# Patient Record
Sex: FEMALE | Race: WHITE | NOT HISPANIC OR LATINO
[De-identification: names, ages, dates, MRNs, and addresses within clinical notes are randomized per-mention and may not be internally consistent; named-entity substitution may affect disease eponyms.]

---

## 2023-06-08 PROBLEM — Z00.00 ENCOUNTER FOR PREVENTIVE HEALTH EXAMINATION: Status: ACTIVE | Noted: 2023-06-08

## 2023-06-20 ENCOUNTER — NON-APPOINTMENT (OUTPATIENT)
Age: 63
End: 2023-06-20

## 2023-06-20 ENCOUNTER — APPOINTMENT (OUTPATIENT)
Dept: NEUROSURGERY | Facility: CLINIC | Age: 63
End: 2023-06-20
Payer: MEDICAID

## 2023-06-20 VITALS
TEMPERATURE: 98.6 F | DIASTOLIC BLOOD PRESSURE: 74 MMHG | HEART RATE: 83 BPM | BODY MASS INDEX: 40.26 KG/M2 | SYSTOLIC BLOOD PRESSURE: 133 MMHG | WEIGHT: 250.5 LBS | OXYGEN SATURATION: 97 % | RESPIRATION RATE: 81 BRPM | HEIGHT: 66 IN

## 2023-06-20 DIAGNOSIS — I67.2 CEREBRAL ATHEROSCLEROSIS: ICD-10-CM

## 2023-06-20 DIAGNOSIS — Z01.818 ENCOUNTER FOR OTHER PREPROCEDURAL EXAMINATION: ICD-10-CM

## 2023-06-20 PROCEDURE — 99205 OFFICE O/P NEW HI 60 MIN: CPT

## 2023-06-21 NOTE — ASSESSMENT
[FreeTextEntry1] : I have discussed the natural history and treatment options for intracranial atherosclerosis and symptomatic left M1 stenosis with the patient. I explained the indications for observation and imaging surveillance, medical management, and surgery. I discussed the risks, benefits, possible complications and expected outcome related to each treatment option.  In the end, I recommend MRI brain/MRA NOVA study (which will need to be done under sedation). The patient should continue ASA 81mg daily and should continue daily statin therapy with aim for goal LDL <70 and metformin for HbA1c <6.0 for stroke prevention.  We will obtain SPECT with diamox study to check for cerebrovascular reserve.   We will plan a diagnostic cerebral angiogram in the coming weeks to evaluate her cerebral vasculature.  We will obtain updated accumetrics for ASA efficacy and Lipid panel and HbA1c to assess stroke risk factors.  The patient understands the plan of care and is in agreement.  All questions answered to patient satisfaction.\par \par \par \par

## 2023-06-21 NOTE — HISTORY OF PRESENT ILLNESS
[de-identified] : SUSANNE BYRD is a 62 year right-handed Polish speaking female with a PMH of Asthma/COPD, hypothyroid, current 5-6 cigarettes/day smoker, DM, HTN, HLD, morbid obesity  who presents to the office today for neuroendovascular consultation due to recent ED visit to [facility] for headache and hypertensive urgency on [facility] Day.  Stroke code was activated and her head CT was negative for hemorrhage and CTA showed Left sided M1 stenosis.  She reports that 2 days prior, she had an episode of right sided numbness of her face arm and leg and difficulty getting words out that lasted a few minutes.  She does not recall if there was any weakness at the time.  She has been on baby ASA and atorvastatin long-standing and reports compliance with her medications.  BP was treated and she was seen by Neurology there who recommended neuroendovascular outpatient follow up for stenosis.  She did not undergo an MRI at the time due to claustrophobia.  She was discharged home with her previous regimen of baby ASA and atorvastatin 40mg.  Recent Lipid panel or A1c not available.  She has not had any additional episodes since that day, however she does report a similar episode about a year ago in which she experienced right sided transient numbness but no speech difficulty, that self-resolved, and for which she did not seek medical attention at the time. \par \par The patient denies headaches, visual change, numbness, weakness of extremities, speech disturbance, dizziness, neck pain, vertigo.  \par Surg Hx: thyroid surgery, hysterectomy, knee surgery, varicose vein surgery, breast biopsy (benign)\par Meds: albuterol, norvasc, atenolol, synthroid, metformin, pantoprazole.  \par Allergies: NKDA\par Soc Hx: current smoker, no EtOH\par \par

## 2023-06-30 ENCOUNTER — RESULT REVIEW (OUTPATIENT)
Age: 63
End: 2023-06-30

## 2023-07-13 LAB
ALBUMIN SERPL ELPH-MCNC: 4.4 G/DL
ALP BLD-CCNC: 100 U/L
ALT SERPL-CCNC: 25 U/L
ANION GAP SERPL CALC-SCNC: 14 MMOL/L
APPEARANCE: CLEAR
APTT BLD: 30.4 SEC
AST SERPL-CCNC: 13 U/L
BACTERIA: NEGATIVE /HPF
BILIRUB SERPL-MCNC: 0.4 MG/DL
BILIRUBIN URINE: NEGATIVE
BLOOD URINE: NEGATIVE
BUN SERPL-MCNC: 18 MG/DL
CALCIUM SERPL-MCNC: 9.2 MG/DL
CAST: 0 /LPF
CHLORIDE SERPL-SCNC: 106 MMOL/L
CHOLEST SERPL-MCNC: 180 MG/DL
CO2 SERPL-SCNC: 21 MMOL/L
COLOR: YELLOW
CREAT SERPL-MCNC: 0.81 MG/DL
EGFR: 82 ML/MIN/1.73M2
EPITHELIAL CELLS: 6 /HPF
ESTIMATED AVERAGE GLUCOSE: 134 MG/DL
GLUCOSE QUALITATIVE U: NEGATIVE MG/DL
GLUCOSE SERPL-MCNC: 102 MG/DL
HBA1C MFR BLD HPLC: 6.3 %
HDLC SERPL-MCNC: 42 MG/DL
INR PPP: 1.03 RATIO
KETONES URINE: NEGATIVE MG/DL
LDLC SERPL CALC-MCNC: 93 MG/DL
LEUKOCYTE ESTERASE URINE: NEGATIVE
MICROSCOPIC-UA: NORMAL
NITRITE URINE: NEGATIVE
NONHDLC SERPL-MCNC: 138 MG/DL
PH URINE: 6
PLATELET RESPONSE ASPIRIN: 462 ARU
POTASSIUM SERPL-SCNC: 4.4 MMOL/L
PROT SERPL-MCNC: 6.9 G/DL
PROTEIN URINE: NEGATIVE MG/DL
PT BLD: 11.9 SEC
RED BLOOD CELLS URINE: 1 /HPF
SODIUM SERPL-SCNC: 141 MMOL/L
SPECIFIC GRAVITY URINE: 1.02
TRIGL SERPL-MCNC: 268 MG/DL
UROBILINOGEN URINE: 0.2 MG/DL
WHITE BLOOD CELLS URINE: 0 /HPF

## 2023-08-04 ENCOUNTER — TRANSCRIPTION ENCOUNTER (OUTPATIENT)
Age: 63
End: 2023-08-04

## 2023-08-04 ENCOUNTER — APPOINTMENT (OUTPATIENT)
Dept: NEUROSURGERY | Facility: HOSPITAL | Age: 63
End: 2023-08-04

## 2023-08-04 ENCOUNTER — OUTPATIENT (OUTPATIENT)
Dept: OUTPATIENT SERVICES | Facility: HOSPITAL | Age: 63
LOS: 1 days | Discharge: HOME | End: 2023-08-04
Payer: MEDICAID

## 2023-08-04 ENCOUNTER — RESULT REVIEW (OUTPATIENT)
Age: 63
End: 2023-08-04

## 2023-08-04 LAB — GLUCOSE BLDC GLUCOMTR-MCNC: 92 MG/DL — SIGNIFICANT CHANGE UP (ref 70–99)

## 2023-08-04 PROCEDURE — 36227 PLACE CATH XTRNL CAROTID: CPT | Mod: LT

## 2023-08-04 PROCEDURE — 36226 PLACE CATH VERTEBRAL ART: CPT | Mod: RT

## 2023-08-04 PROCEDURE — 76937 US GUIDE VASCULAR ACCESS: CPT | Mod: 26

## 2023-08-04 PROCEDURE — 36224 PLACE CATH CAROTD ART: CPT | Mod: 50

## 2023-08-04 RX ORDER — SODIUM CHLORIDE 9 MG/ML
1000 INJECTION INTRAMUSCULAR; INTRAVENOUS; SUBCUTANEOUS
Refills: 0 | Status: DISCONTINUED | OUTPATIENT
Start: 2023-08-04 | End: 2023-08-18

## 2023-08-04 RX ORDER — CHLORHEXIDINE GLUCONATE 213 G/1000ML
1 SOLUTION TOPICAL ONCE
Refills: 0 | Status: DISCONTINUED | OUTPATIENT
Start: 2023-08-04 | End: 2023-08-18

## 2023-08-04 RX ORDER — FENTANYL CITRATE 50 UG/ML
25 INJECTION INTRAVENOUS ONCE
Refills: 0 | Status: DISCONTINUED | OUTPATIENT
Start: 2023-08-04 | End: 2023-08-04

## 2023-08-04 RX ORDER — SODIUM CHLORIDE 9 MG/ML
1000 INJECTION, SOLUTION INTRAVENOUS
Refills: 0 | Status: DISCONTINUED | OUTPATIENT
Start: 2023-08-04 | End: 2023-08-18

## 2023-08-04 RX ADMIN — SODIUM CHLORIDE 75 MILLILITER(S): 9 INJECTION, SOLUTION INTRAVENOUS at 10:09

## 2023-08-04 NOTE — PRE-ANESTHESIA EVALUATION ADULT - NSANTHOSAYNRD_GEN_A_CORE
No. MILVIA screening performed.  STOP BANG Legend: 0-2 = LOW Risk; 3-4 = INTERMEDIATE Risk; 5-8 = HIGH Risk

## 2023-08-04 NOTE — BRIEF OPERATIVE NOTE - OPERATION/FINDINGS
Patient was brought to the angio suite, placed on x-ray table, placed on standard monitor by anesthesiologist. Right wrist and B/l groins were prepped and draped in usual sterile fashion.   5 Malaysian short slender sheath was used in the right radial artery for access. Radial cocktail containing 2,000 units of heparin, 2.5mg of verapamil, and 200mcg of nitroglycerin were given.   A diagnostic angiogram was performed under monitored anesthesia care. B/l ICAs, b/l ECAs and right vertebral artery were injected and studied.     Findings:   There is severe stenosis of proximal left M1. There are pial collaterals to left MCA territory characteristic of moyamoya type phenomenon.   BIlateral Dayne are patent.     Full report to follow  Patient tolerated the procedure well and at baseline neurological condition.   Right radial vascular access site was applied TR band with 14cc of air  There is no hematoma.   Patient was transferred to cath lab recovery and will return home later today.     Above discussed with Dr. Hilliard.

## 2023-08-04 NOTE — BRIEF OPERATIVE NOTE - NSICDXBRIEFPREOP_GEN_ALL_CORE_FT
PRE-OP DIAGNOSIS:  Arterial ischemic stroke, MCA (middle cerebral artery), left, remote, resolved 04-Aug-2023 09:47:38  Ralph Contreras

## 2023-08-04 NOTE — BRIEF OPERATIVE NOTE - NSICDXBRIEFPOSTOP_GEN_ALL_CORE_FT
POST-OP DIAGNOSIS:  Arterial ischemic stroke, MCA (middle cerebral artery), left, remote, resolved 04-Aug-2023 09:47:42  Ralph Contreras

## 2023-08-04 NOTE — PROGRESS NOTE ADULT - SUBJECTIVE AND OBJECTIVE BOX
Procedure: Cerebral angiogram  Doctor: Dr. Hilliard  Consent: Signed by: patient                   NAME/NUMBER if not patient:     SUBJECTIVE:    62 year right-handed Polish speaking female with a PMH of Asthma/COPD, hypothyroid, current 5-6 cigarettes/day smoker, DM, HTN, HLD, morbid obesity, left M1 stenosis presenting for cerebral angiogram.   Patient had a recent ED visit to [facility] for headache and hypertensive urgency on Memorial Day. Stroke code was activated and her head CT was negative for hemorrhage and CTA showed Left sided M1 stenosis. She reports that 2 days prior, she had an episode of right sided numbness of her face arm and leg and difficulty getting words out that lasted a few minutes. She does not recall if there was any weakness at the time. She has been on baby ASA and atorvastatin long-standing and reports compliance with her medications. BP was treated and she was seen by Neurology there who recommended neuroendovascular outpatient follow up for stenosis. She did not undergo an MRI at the time due to claustrophobia. She was discharged home with her previous regimen of baby ASA and atorvastatin 40mg. Recent Lipid panel or A1c not available. She has not had any additional episodes since that day, however she does report a similar episode about a year ago in which she experienced right sided transient numbness but no speech difficulty, that self-resolved, and for which she did not seek medical attention at the time.     PMHx: as above  PSHx: thyroid surgery, hysterectomy, knee surgery, varicose vein surgery, breast biopsy (benign)  Social Hx: Current smoker, no alcohol, no drug use  Medications: albuterol, norvasc, atenolol, synthroid, metformin, pantoprazole.   Allergies: NKDA    T(C): --  HR: --  BP: --  RR: --  SpO2: --  Wt(kg): --    EXAM:  Neurological: AOx3, NAD, FC, speech coherent   CN II-XII: EOMI, PERRL, face symmetric, tongue midline   Motor: MAEx4 5/5 UE and LE B/L   SILT throughout  WWP throughout   No pronator drift   Cardiovascular: normal rate and rhythm  Respiratory: unlabored breathing on RA   Gastrointestinal: abd soft, NT, ND   Extremities: no LE edema, DP pulses intact                Pregnancy test (serum hcg for any female under 56, must be resulted day before OR): [ ] Negative Result  [ ] Positive Result  [ X] N/A : male or female>55 y/o      COVID swab (in past 72hrs): _ Y  XN    CXR: normal  EKG: NSR  ECHO if available: n/a  Medical Clearances: see paper chart    Heparin drip:               If yes --> Needs to be held 2 hours prior to angiogram, order placed: []yes   [X]no, primary team aware []yes   []no   []n/a    Contrast allergy: [] yes   [X] no  If yes --> premedication ordered []y []n    Implanted Devices (pacemaker, drug pump...etc):  []YES   [X] NO                  If yes --> EPS consulted to interrogate device: [ ] YES  [ ] NO                            If yes -->  EPS called to let them know patient going for procedure: [ ] device needs to be turned off                                                                                                                                                 [ ] magnet needs to be placed for surgery                                                                                                                                                [ ] nothing to do per EP, may proceed with cautery use in OR                                       Assessment:  62 year right-handed Polish speaking female with a PMH of Asthma/COPD, hypothyroid, current 5-6 cigarettes/day smoker, DM, HTN, HLD, morbid obesity, left M1 stenosis presenting for cerebral angiogram.   Plan:    - Consent for cerebral angiogram obtained and in chart, all risks, benefits and alternatives discussed including risk of bleeding at access site, infection, spinal headache, stroke, vessel dissection  - NPO/IVF  - Return to cath lab recovery post procedure     Assessment:  Present when checked    []  GCS  E   V  M     Heart Failure: []Acute, [] acute on chronic , []chronic  Heart Failure:  [] Diastolic (HFpEF), [] Systolic (HFrEF), []Combined (HFpEF and HFrEF), [] RHF, [] Pulm HTN, [] Other    [] SEAN, [] ATN, [] AIN, [] other  [] CKD1, [] CKD2, [] CKD 3, [] CKD 4, [] CKD 5, []ESRD    Encephalopathy: [] Metabolic, [] Hepatic, [] toxic, [] Neurological, [] Other    Abnormal Nurtitional Status: [] malnurtition (see nutrition note), [ ]underweight: BMI < 19, [] morbid obesity: BMI >40, [] Cachexia    [] Sepsis  [] hypovolemic shock,[] cardiogenic shock, [] hemorrhagic shock, [] neuogenic shock  [] Acute Respiratory Failure  []Cerebral edema, [] Brain compression/ herniation,   [] Functional quadriplegia  [] Acute blood loss anemia

## 2023-08-04 NOTE — BRIEF OPERATIVE NOTE - NSICDXBRIEFPROCEDURE_GEN_ALL_CORE_FT
PROCEDURES:  Angiogram, carotid and cerebral vessels, bilateral 04-Aug-2023 09:46:50  Ralph Contreras

## 2023-08-05 PROCEDURE — C1887: CPT

## 2023-08-05 PROCEDURE — 36224 PLACE CATH CAROTD ART: CPT | Mod: 50

## 2023-08-05 PROCEDURE — C1894: CPT

## 2023-08-05 PROCEDURE — 36227 PLACE CATH XTRNL CAROTID: CPT | Mod: LT

## 2023-08-05 PROCEDURE — 36226 PLACE CATH VERTEBRAL ART: CPT | Mod: RT

## 2023-08-05 PROCEDURE — C1769: CPT

## 2023-08-05 PROCEDURE — 82962 GLUCOSE BLOOD TEST: CPT

## 2023-08-07 ENCOUNTER — NON-APPOINTMENT (OUTPATIENT)
Age: 63
End: 2023-08-07

## 2023-08-08 DIAGNOSIS — I66.02 OCCLUSION AND STENOSIS OF LEFT MIDDLE CEREBRAL ARTERY: ICD-10-CM

## 2023-08-08 DIAGNOSIS — E03.9 HYPOTHYROIDISM, UNSPECIFIED: ICD-10-CM

## 2023-08-08 DIAGNOSIS — E78.5 HYPERLIPIDEMIA, UNSPECIFIED: ICD-10-CM

## 2023-08-08 DIAGNOSIS — E11.9 TYPE 2 DIABETES MELLITUS WITHOUT COMPLICATIONS: ICD-10-CM

## 2023-08-08 DIAGNOSIS — Z86.73 PERSONAL HISTORY OF TRANSIENT ISCHEMIC ATTACK (TIA), AND CEREBRAL INFARCTION WITHOUT RESIDUAL DEFICITS: ICD-10-CM

## 2023-08-08 DIAGNOSIS — I10 ESSENTIAL (PRIMARY) HYPERTENSION: ICD-10-CM

## 2023-08-08 DIAGNOSIS — J45.909 UNSPECIFIED ASTHMA, UNCOMPLICATED: ICD-10-CM

## 2023-08-14 ENCOUNTER — APPOINTMENT (OUTPATIENT)
Dept: NUCLEAR MEDICINE | Facility: HOSPITAL | Age: 63
End: 2023-08-14

## 2023-08-14 ENCOUNTER — OUTPATIENT (OUTPATIENT)
Dept: OUTPATIENT SERVICES | Facility: HOSPITAL | Age: 63
LOS: 1 days | End: 2023-08-14
Payer: MEDICAID

## 2023-08-14 RX ORDER — ACETAZOLAMIDE 250 MG/1
1000 TABLET ORAL ONCE
Refills: 0 | Status: COMPLETED | OUTPATIENT
Start: 2023-08-14 | End: 2023-08-14

## 2023-08-14 RX ORDER — ACETAZOLAMIDE 250 MG/1
1000 TABLET ORAL ONCE
Refills: 0 | Status: DISCONTINUED | OUTPATIENT
Start: 2023-08-14 | End: 2023-08-14

## 2023-08-14 RX ADMIN — ACETAZOLAMIDE 360 MILLIGRAM(S): 250 TABLET ORAL at 11:29

## 2023-08-15 PROCEDURE — 78832 RP LOCLZJ TUM SPECT W/CT 2: CPT | Mod: 26

## 2023-08-15 PROCEDURE — A9557: CPT

## 2023-08-15 PROCEDURE — 78832 RP LOCLZJ TUM SPECT W/CT 2: CPT

## 2023-08-17 ENCOUNTER — NON-APPOINTMENT (OUTPATIENT)
Age: 63
End: 2023-08-17

## 2023-09-01 NOTE — REASON FOR VISIT
[Home] : at home, [unfilled] , at the time of the visit. [Medical Office: ([facility])___] : at the medical office located in  [Patient] : the patient [Consultation] : a consultation visit [Referred By: _________] : Patient was referred by GUNJAN

## 2023-09-07 ENCOUNTER — APPOINTMENT (OUTPATIENT)
Dept: NEUROSURGERY | Facility: CLINIC | Age: 63
End: 2023-09-07
Payer: MEDICAID

## 2023-09-07 DIAGNOSIS — F17.200 NICOTINE DEPENDENCE, UNSPECIFIED, UNCOMPLICATED: ICD-10-CM

## 2023-09-07 DIAGNOSIS — I66.9 OCCLUSION AND STENOSIS OF UNSPECIFIED CEREBRAL ARTERY: ICD-10-CM

## 2023-09-07 PROCEDURE — 99204 OFFICE O/P NEW MOD 45 MIN: CPT | Mod: 95

## 2023-09-07 PROCEDURE — 99214 OFFICE O/P EST MOD 30 MIN: CPT | Mod: 95

## 2023-09-07 RX ORDER — ASPIRIN 81 MG/1
81 TABLET, CHEWABLE ORAL
Refills: 0 | Status: ACTIVE | COMMUNITY

## 2023-09-07 RX ORDER — ATORVASTATIN CALCIUM 80 MG/1
TABLET, FILM COATED ORAL
Refills: 0 | Status: ACTIVE | COMMUNITY

## 2023-09-07 NOTE — HISTORY OF PRESENT ILLNESS
[de-identified] : 62 year right-handed Polish speaking female with a past medical history of Asthma/COPD, hypothyroid, current 5-6 cigarettes/day smoker, DM, HTN, HLD, morbid obesity referred for neurosurgical evaluation of LEFT M1 stenosis.  Ms. Joe initially presented to the office for neuroendovascular consultation due to recent ED visit to [facility] for headache and hypertensive urgency on Memorial Day.  Stroke code was activated and her head CT was negative for hemorrhage and CTA showed Left sided M1 stenosis.  She reported that 2 days prior, she had an episode of right sided numbness of her face arm and leg and difficulty getting words out that lasted a few minutes.  She does not recall if there was any weakness at the time.  Was on baby ASA and atorvastatin long-standing and reports compliance with her medications.    She was unable to obtain MRI at the time due to claustrophobia.  She was discharged home with her previous regimen of baby ASA and atorvastatin 40mg. She has not had any additional episodes since that day, however she does report a similar episode about a year ago in which she experienced right sided transient numbness but no speech difficulty, that self-resolved, and for which she did not seek medical attention at the time.   She had BRAIN SPECT done on 8/15/23 which demonstrates a decreased perfusion in left frontal lobe which is slightly improves with nondiamox, suggesting a reversible defect. She had DCA done on 8/4/23.  Comes to the office today to discuss potential surgical intervention for LEFT MCA stenosis.  The patient denies headaches, visual change, numbness, weakness of extremities, speech disturbance, dizziness, neck pain, vertigo.   Surg Hx: thyroid surgery, hysterectomy, knee surgery, varicose vein surgery, breast biopsy (benign) Meds: albuterol, norvasc, atenolol, synthroid, metformin, pantoprazole.   Allergies: NKDA Soc Hx: current smoker, no EtOH

## 2023-09-07 NOTE — DATA REVIEWED
[de-identified] : ACC: [id number] EXAM: NM BRAIN SPECT CT SA MD ORDERED BY: RAFFAELE YA  PROCEDURE DATE: 08/15/2023    INTERPRETATION: BRAIN SPECT WITH AND WITHOUT DIAMOX CHALLENGE  Indication: Left MCA stenosis  Procedure: The patient was infused with 1000 mg of Diamox by slow IV push and was allowed to rest for 20 minutes. The patient was then injected with about 20 mCi of technetium 99m labeled ECD and SPECT imaging of the brain was performed at about 90 minutes.  SPECT images were reconstructed in axial, sagittal and coronal planes using CT based scatter correction and attenuation correction as well as geometry based resolution recovery. Images were displayed as SPECT, CT and fused data sets as well as maximum intensity pixel projections.  This study was repeated on a separate day without Diamox challenge.  Findings: There is mild relative decreased perfusion in the left frontal lobe on Diamox imaging which improves on non-Diamox imaging suggesting potential reversible perfusion defect. Otherwise there is no significant difference in perfusion in the bilateral cerebral hemispheres on the Diamox and non-Diamox imaging.  Impression: Mild decrease perfusion in the left frontal lobe which improves on non-Diamox imaging suggesting potential reversible perfusion defect.  --- End of Report ---      FRANCES LINTON MD; Attending Radiologist This document has been electronically signed. Aug 18 2023 9:28AM

## 2023-09-07 NOTE — ASSESSMENT
[FreeTextEntry1] : Reviewed all imaging with patient including DCA and Brain SPECT, demonstrates LEFT M1 stenosis. BRAIN SPECT does demonstrate a perfusion deficit in the left frontal lobe that is worse on diamox imaging. We discussed potential surgical interventions, LEFT STA-MCA bypass. Based on diagnostic testing, we discussed that the patient may be a good candidate for revascularization surgery to prevent stroke. Risks, benefits and alternatives to surgery discussed with patient in detail.  Prior to finalizing treatment plan, recommend MRA NOVA. After imaging complete, return to the office to review imaging.  All questions answered to patient's satisfaction.  Patient and patient's family verbalize agreement and understanding with plan of care. I, Dr. Nguyen, personally performed the evaluation and management (E/M) services for this new patient.  That E/M includes conducting the initial examination, assessing all conditions, and establishing the plan of care.  Today, my ACP, Angela Obrien, was here to observe my evaluation and management services for this patient to be followed going forward.

## 2023-09-22 ENCOUNTER — APPOINTMENT (OUTPATIENT)
Dept: MRI IMAGING | Facility: HOSPITAL | Age: 63
End: 2023-09-22

## 2024-03-05 ENCOUNTER — NON-APPOINTMENT (OUTPATIENT)
Age: 64
End: 2024-03-05